# Patient Record
Sex: FEMALE | HISPANIC OR LATINO
[De-identification: names, ages, dates, MRNs, and addresses within clinical notes are randomized per-mention and may not be internally consistent; named-entity substitution may affect disease eponyms.]

---

## 2020-08-24 PROBLEM — Z00.00 ENCOUNTER FOR PREVENTIVE HEALTH EXAMINATION: Status: ACTIVE | Noted: 2020-08-24

## 2020-09-14 ENCOUNTER — APPOINTMENT (OUTPATIENT)
Dept: UROGYNECOLOGY | Facility: CLINIC | Age: 55
End: 2020-09-14
Payer: COMMERCIAL

## 2020-09-14 VITALS
DIASTOLIC BLOOD PRESSURE: 89 MMHG | WEIGHT: 224 LBS | SYSTOLIC BLOOD PRESSURE: 153 MMHG | TEMPERATURE: 97.7 F | BODY MASS INDEX: 37.32 KG/M2 | HEIGHT: 65 IN | OXYGEN SATURATION: 97 % | HEART RATE: 71 BPM

## 2020-09-14 VITALS — DIASTOLIC BLOOD PRESSURE: 80 MMHG | HEART RATE: 74 BPM | TEMPERATURE: 96.1 F | SYSTOLIC BLOOD PRESSURE: 145 MMHG

## 2020-09-14 DIAGNOSIS — I10 ESSENTIAL (PRIMARY) HYPERTENSION: ICD-10-CM

## 2020-09-14 DIAGNOSIS — Z82.49 FAMILY HISTORY OF ISCHEMIC HEART DISEASE AND OTHER DISEASES OF THE CIRCULATORY SYSTEM: ICD-10-CM

## 2020-09-14 DIAGNOSIS — K59.00 CONSTIPATION, UNSPECIFIED: ICD-10-CM

## 2020-09-14 DIAGNOSIS — N39.3 STRESS INCONTINENCE (FEMALE) (MALE): ICD-10-CM

## 2020-09-14 DIAGNOSIS — N81.6 RECTOCELE: ICD-10-CM

## 2020-09-14 DIAGNOSIS — Z78.9 OTHER SPECIFIED HEALTH STATUS: ICD-10-CM

## 2020-09-14 DIAGNOSIS — Z80.3 FAMILY HISTORY OF MALIGNANT NEOPLASM OF BREAST: ICD-10-CM

## 2020-09-14 DIAGNOSIS — Z83.3 FAMILY HISTORY OF DIABETES MELLITUS: ICD-10-CM

## 2020-09-14 DIAGNOSIS — E66.9 OBESITY, UNSPECIFIED: ICD-10-CM

## 2020-09-14 LAB
BILIRUB UR QL STRIP: NORMAL
CLARITY UR: CLEAR
COLLECTION METHOD: NORMAL
GLUCOSE UR-MCNC: NORMAL
HCG UR QL: 0.2 EU/DL
HGB UR QL STRIP.AUTO: NORMAL
KETONES UR-MCNC: NORMAL
LEUKOCYTE ESTERASE UR QL STRIP: NORMAL
NITRITE UR QL STRIP: NORMAL
PH UR STRIP: 5.5
PROT UR STRIP-MCNC: NORMAL
SP GR UR STRIP: 1

## 2020-09-14 PROCEDURE — 99205 OFFICE O/P NEW HI 60 MIN: CPT | Mod: 25

## 2020-09-14 PROCEDURE — 51701 INSERT BLADDER CATHETER: CPT

## 2020-09-14 PROCEDURE — 81003 URINALYSIS AUTO W/O SCOPE: CPT | Mod: QW

## 2020-09-14 RX ORDER — BUDESONIDE AND FORMOTEROL FUMARATE DIHYDRATE 160; 4.5 UG/1; UG/1
160-4.5 AEROSOL RESPIRATORY (INHALATION)
Qty: 10 | Refills: 0 | Status: ACTIVE | COMMUNITY
Start: 2020-04-06

## 2020-09-14 RX ORDER — DOXYCYCLINE HYCLATE 100 MG/1
100 TABLET ORAL
Qty: 14 | Refills: 0 | Status: DISCONTINUED | COMMUNITY
Start: 2020-03-19

## 2020-09-14 RX ORDER — ERGOCALCIFEROL 1.25 MG/1
1.25 MG CAPSULE, LIQUID FILLED ORAL
Qty: 13 | Refills: 0 | Status: ACTIVE | COMMUNITY
Start: 2020-06-19

## 2020-09-14 RX ORDER — DOXYCYCLINE HYCLATE 100 MG/1
100 CAPSULE ORAL
Refills: 0 | Status: ACTIVE | COMMUNITY

## 2020-09-14 RX ORDER — AZITHROMYCIN 500 MG/1
500 TABLET, FILM COATED ORAL
Qty: 3 | Refills: 0 | Status: DISCONTINUED | COMMUNITY
Start: 2020-03-31

## 2020-09-14 RX ORDER — AZITHROMYCIN 250 MG/1
250 TABLET, FILM COATED ORAL
Qty: 6 | Refills: 0 | Status: DISCONTINUED | COMMUNITY
Start: 2020-04-18

## 2020-09-14 RX ORDER — PREDNISONE 20 MG/1
20 TABLET ORAL
Qty: 25 | Refills: 0 | Status: DISCONTINUED | COMMUNITY
Start: 2020-03-31

## 2020-09-14 RX ORDER — METHYLPREDNISOLONE 4 MG/1
4 TABLET ORAL
Qty: 21 | Refills: 0 | Status: DISCONTINUED | COMMUNITY
Start: 2020-03-19

## 2020-09-14 RX ORDER — SULFAMETHOXAZOLE AND TRIMETHOPRIM 800; 160 MG/1; MG/1
800-160 TABLET ORAL
Qty: 6 | Refills: 0 | Status: DISCONTINUED | COMMUNITY
Start: 2020-08-11

## 2020-09-14 RX ORDER — PREDNISONE 50 MG/1
TABLET ORAL
Refills: 0 | Status: ACTIVE | COMMUNITY

## 2020-09-14 RX ORDER — LOSARTAN POTASSIUM 100 MG/1
100 TABLET, FILM COATED ORAL
Qty: 90 | Refills: 0 | Status: ACTIVE | COMMUNITY
Start: 2020-06-19

## 2020-09-14 NOTE — PHYSICAL EXAM
[No Acute Distress] : in no acute distress [Chaperone Present] : A chaperone was present in the examining room during all aspects of the physical examination [Oriented x3] : oriented to person, place, and time [Well developed] : well developed [Labia Majora] : were normal [Labia Minora] : were normal [Normal Appearance] : general appearance was normal [Atrophy] : atrophy [Normal] : normal [Uterine Adnexae] : were not tender and not enlarged [Well Nourished] : ~L well nourished [Bulbocavernous] : bulbocavernous was present [Anal Reflex] : the anal reflex was present [Obese] : obese [Warm and Dry] : was warm and dry to touch [Normal Gait] : gait was normal [Rectocele] : a rectocele [4] : 4 [Ap ____] : Ap [unfilled] [Bp ____] : Bp [unfilled] [Post Void Residual ____ml] : post void residual was [unfilled] ml [Normal rectal exam] : was normal [Distended] : not distended [Tenderness] : ~T no ~M abdominal tenderness observed [Inguinal LAD] : no adenopathy was noted in the inguinal lymph nodes [H/Smegaly] : no hepatosplenomegaly

## 2020-09-14 NOTE — HISTORY OF PRESENT ILLNESS
[Vaginal Wall Prolapse] : no [Unable To Restrain Bowel Movement] : no [Urinary Frequency] : no [Urinary Frequency More Than Twice At Night (Nocturia)] : no nocturia [Feelings Of Urinary Urgency] : no [] : years ago [FreeTextEntry5] : sometimes [FreeTextEntry3] : once every few months [de-identified] : 1.5 [de-identified] : 1.5-2 [de-identified] : many [de-identified] : slight sometimes [de-identified] : very rare with cough [de-identified] : drinks a lot [de-identified] : 1.5 [de-identified] : very rare [FreeTextEntry6] : every other day [de-identified] : + sexually active [de-identified] : many

## 2020-09-14 NOTE — DISCUSSION/SUMMARY
[FreeTextEntry1] : I reviewed the above findings with the patient. Treatment options for the rectocele were discussed and included doing nothing, Kegel exercises and behavioral modification, weight loss, a pessary, or surgical correction.Surgically we discussed a posterior repair. We discussed management of her constipation with either a Citrucel or MiraLax. We discussed the stress incontinence, as well as surgical and nonsurgical treatment options. She elects to start with conservative management for the prolapse, as well as management for her constipation. IUGA, patient information on pelvic organ prolapse, stress incontinence, constipation, and Kegel exercises was given to her. I've asked her to followup here on an as-needed basis. All questions were answered.\par